# Patient Record
Sex: FEMALE | Employment: OTHER | URBAN - NONMETROPOLITAN AREA
[De-identification: names, ages, dates, MRNs, and addresses within clinical notes are randomized per-mention and may not be internally consistent; named-entity substitution may affect disease eponyms.]

---

## 2023-05-30 ENCOUNTER — OFFICE VISIT (OUTPATIENT)
Dept: NEUROLOGY | Age: 53
End: 2023-05-30
Payer: MEDICAID

## 2023-05-30 VITALS
HEIGHT: 61 IN | SYSTOLIC BLOOD PRESSURE: 122 MMHG | HEART RATE: 62 BPM | DIASTOLIC BLOOD PRESSURE: 70 MMHG | BODY MASS INDEX: 37.76 KG/M2 | OXYGEN SATURATION: 100 % | WEIGHT: 200 LBS

## 2023-05-30 DIAGNOSIS — R51.9 NONINTRACTABLE HEADACHE, UNSPECIFIED CHRONICITY PATTERN, UNSPECIFIED HEADACHE TYPE: Primary | ICD-10-CM

## 2023-05-30 DIAGNOSIS — R51.9 NONINTRACTABLE HEADACHE, UNSPECIFIED CHRONICITY PATTERN, UNSPECIFIED HEADACHE TYPE: ICD-10-CM

## 2023-05-30 LAB
CRP SERPL HS-MCNC: 2.52 MG/DL (ref 0–0.5)
ERYTHROCYTE [SEDIMENTATION RATE] IN BLOOD BY WESTERGREN METHOD: 28 MM/HR (ref 0–25)

## 2023-05-30 PROCEDURE — 99214 OFFICE O/P EST MOD 30 MIN: CPT | Performed by: NURSE PRACTITIONER

## 2023-05-30 RX ORDER — LISINOPRIL 5 MG/1
TABLET ORAL
COMMUNITY
Start: 2023-05-23

## 2023-05-30 RX ORDER — NORTRIPTYLINE HYDROCHLORIDE 25 MG/1
25 CAPSULE ORAL NIGHTLY
Qty: 30 CAPSULE | Refills: 3 | Status: SHIPPED | OUTPATIENT
Start: 2023-05-30

## 2023-05-30 NOTE — PROGRESS NOTES
REVIEW OF SYSTEMS    Constitutional: []Fever []Sweat []Chills [] Recent Injury [x] Denies all unless marked  HEENT:[x]Headache  [] Head Injury/Hearing Loss  [] Sore Throat  [] Ear Ache/Dizziness  [x] Denies all unless marked  Spine:  [] Neck pain  [] Back pain  [] Sciaticia  [x] Denies all unless marked  Cardiovascular:[]Heart Disease []Chest Pain [] Palpitations  [x] Denies all unless marked  Pulmonary: []Shortness of Breath []Cough   [x] Denies all unless marke  Gastrointestinal: []Nausea  []Vomiting  []Abdominal Pain  []Constipation  []Diarrhea  []Dark Bloody Stools  [x] Denies all unless marked  Psychiatric/Behavioral:[] Depression [x] Anxiety [x] Denies all unless marked  Genitourinary:   [] Frequency  [] Urgency  [] Incontinence [] Pain with Urination  [x] Denies all unless marked  Extremities: []Pain  []Swelling  [x] Denies all unless marked  Musculoskeletal: [] Muscle Pain  [] Joint Pain  [] Arthritis [] Muscle Cramps [] Muscle Twitches  [x] Denies all unless marked  Sleep: [] Insomnia [] Snoring [] Restless Legs [] Sleep Apnea  [] Daytime Sleepiness  [x] Denies all unless marked  Skin:[] Rash [] Skin Discoloration [x] Denies all unless marked   Neurological: []Visual Disturbance/Memory Loss [] Loss of Balance [] Slurred Speech/Weakness [] Seizures  [] Vertigo/Dizziness [x] Denies all unless marked
intact to light touch, vibration, and proprioception BUE  COMMENTS:   Coordination [x]FTN normal bilaterally   []HTS normal bilaterally  [x]DONNA normal bilaterally. COMMENTS:   Reflexes  [x]Symmetric and non-pathological  []Toes down going bilaterally  [x]No clonus present  COMMENTS:   Gait                  [x]Normal steady gait    []Ataxic    []Spastic     []Magnetic     []Shuffling  COMMENTS:       ADDITIONAL REVIEW:    Reviewed referral records. IMPRESSION:  Saumya Amato is a 50 y.o. female here today for evaluation of headaches. These headaches have been ongoing for about 3 months now with progressive worsening. Headaches occurring about 4 times in a week and lasting for an entire day. There is rarely photophobia, aborted with ibuprofen. She is taking ibuprofen about 4 times in a week. She does have prior history of repetitive concussive injuries with waxing and waning headaches around this time that were resolved since 2014. She has tried [employer] with no clear benefit. She denies neck pain. Exam nonfocal.  We will complete MRI brain and inflammatory markers to rule out secondary causes of headache. We will start Pamelor nightly 25 mg for preventative as this can help with both tension and migraine headaches. Decrease NSAID use as there is likely component of rebound/medication overuse headaches as well. ICD-10-CM    1. Nonintractable headache, unspecified chronicity pattern, unspecified headache type  R51.9 MRI BRAIN W WO CONTRAST     Sedimentation Rate     C-Reactive Protein     nortriptyline (PAMELOR) 25 MG capsule          PLAN:  MRI brain W WO  2. Inflammatory markers   3. Pamelor nightly 25 mg. Side effects discussed. 4.  Stop/severely limit NSAID use due to high risk of medication overuse/rebound headaches. 5.  Headache diary to identify triggers. 6. Patient will require follow up to evaluate benefit of medication, side effects, and progression.    7. Return in about 3

## 2023-08-25 ENCOUNTER — TELEPHONE (OUTPATIENT)
Dept: NEUROLOGY | Age: 53
End: 2023-08-25

## 2023-08-25 NOTE — TELEPHONE ENCOUNTER
Called and spoke with patient to inquire about MRI. She states at this time she is not going to have MRI completed. She states that she has figured out the problem and rectified the situation. She thanked Morris for everything but she states that she is healed and she will not need her appointment on 8/28/2023. I voiced my understanding and advised patient that appointment will be cancelled and she could call to reschedule appointment if she needed.